# Patient Record
Sex: MALE | Race: WHITE
[De-identification: names, ages, dates, MRNs, and addresses within clinical notes are randomized per-mention and may not be internally consistent; named-entity substitution may affect disease eponyms.]

---

## 2018-01-11 ENCOUNTER — HOSPITAL ENCOUNTER (OUTPATIENT)
Dept: HOSPITAL 17 - CPRE | Age: 58
End: 2018-01-11
Attending: ORTHOPAEDIC SURGERY
Payer: OTHER GOVERNMENT

## 2018-01-11 DIAGNOSIS — M50.33: ICD-10-CM

## 2018-01-11 DIAGNOSIS — Z01.818: ICD-10-CM

## 2018-01-11 DIAGNOSIS — M50.320: ICD-10-CM

## 2018-01-11 DIAGNOSIS — Z01.810: Primary | ICD-10-CM

## 2018-01-11 DIAGNOSIS — Z01.812: ICD-10-CM

## 2018-01-11 LAB
BASOPHILS # BLD AUTO: 0.1 TH/MM3 (ref 0–0.2)
BASOPHILS NFR BLD: 0.8 % (ref 0–2)
COLOR UR: YELLOW
EOSINOPHIL # BLD: 0.2 TH/MM3 (ref 0–0.4)
EOSINOPHIL NFR BLD: 3.6 % (ref 0–4)
ERYTHROCYTE [DISTWIDTH] IN BLOOD BY AUTOMATED COUNT: 13.5 % (ref 11.6–17.2)
GLUCOSE UR STRIP-MCNC: (no result) MG/DL
HCT VFR BLD CALC: 45.7 % (ref 39–51)
HGB BLD-MCNC: 15.8 GM/DL (ref 13–17)
HGB UR QL STRIP: (no result)
KETONES UR STRIP-MCNC: (no result) MG/DL
LYMPHOCYTES # BLD AUTO: 1.3 TH/MM3 (ref 1–4.8)
LYMPHOCYTES NFR BLD AUTO: 20.1 % (ref 9–44)
MCH RBC QN AUTO: 32.9 PG (ref 27–34)
MCHC RBC AUTO-ENTMCNC: 34.5 % (ref 32–36)
MCV RBC AUTO: 95.3 FL (ref 80–100)
MONOCYTE #: 0.6 TH/MM3 (ref 0–0.9)
MONOCYTES NFR BLD: 8.9 % (ref 0–8)
MUCOUS THREADS #/AREA URNS LPF: (no result) /LPF
NEUTROPHILS # BLD AUTO: 4.4 TH/MM3 (ref 1.8–7.7)
NEUTROPHILS NFR BLD AUTO: 66.6 % (ref 16–70)
NITRITE UR QL STRIP: (no result)
PLATELET # BLD: 154 TH/MM3 (ref 150–450)
PMV BLD AUTO: 9.1 FL (ref 7–11)
RBC # BLD AUTO: 4.79 MIL/MM3 (ref 4.5–5.9)
SP GR UR STRIP: 1.01 (ref 1–1.03)
SQUAMOUS #/AREA URNS HPF: <1 /HPF (ref 0–5)
URINE LEUKOCYTE ESTERASE: (no result)
WBC # BLD AUTO: 6.7 TH/MM3 (ref 4–11)

## 2018-01-11 PROCEDURE — 81001 URINALYSIS AUTO W/SCOPE: CPT

## 2018-01-11 PROCEDURE — 93005 ELECTROCARDIOGRAM TRACING: CPT

## 2018-01-11 PROCEDURE — 36415 COLL VENOUS BLD VENIPUNCTURE: CPT

## 2018-01-11 PROCEDURE — 85025 COMPLETE CBC W/AUTO DIFF WBC: CPT

## 2018-01-11 NOTE — EKG
Date Performed: 01/11/2018       Time Performed: 09:11:16

 

PTAGE:      57 years

 

EKG:      Sinus rhythm 

 

 Normal ECG 

 

NO PREVIOUS TRACING            

 

DOCTOR:   Dulce Varma  Interpretating Date/Time  01/11/2018 16:03:15

## 2018-01-25 ENCOUNTER — HOSPITAL ENCOUNTER (INPATIENT)
Dept: HOSPITAL 17 - HSDI | Age: 58
LOS: 1 days | Discharge: HOME | DRG: 472 | End: 2018-01-26
Attending: ORTHOPAEDIC SURGERY | Admitting: ORTHOPAEDIC SURGERY
Payer: OTHER GOVERNMENT

## 2018-01-25 VITALS
DIASTOLIC BLOOD PRESSURE: 80 MMHG | HEART RATE: 112 BPM | RESPIRATION RATE: 17 BRPM | OXYGEN SATURATION: 94 % | TEMPERATURE: 96.5 F | SYSTOLIC BLOOD PRESSURE: 124 MMHG

## 2018-01-25 VITALS — WEIGHT: 256.62 LBS | BODY MASS INDEX: 34.76 KG/M2 | HEIGHT: 72 IN

## 2018-01-25 VITALS
SYSTOLIC BLOOD PRESSURE: 152 MMHG | HEART RATE: 99 BPM | RESPIRATION RATE: 17 BRPM | DIASTOLIC BLOOD PRESSURE: 95 MMHG | OXYGEN SATURATION: 93 % | TEMPERATURE: 95.5 F

## 2018-01-25 DIAGNOSIS — M48.02: ICD-10-CM

## 2018-01-25 DIAGNOSIS — I10: ICD-10-CM

## 2018-01-25 DIAGNOSIS — Z72.0: ICD-10-CM

## 2018-01-25 DIAGNOSIS — Z88.0: ICD-10-CM

## 2018-01-25 DIAGNOSIS — F32.9: ICD-10-CM

## 2018-01-25 DIAGNOSIS — F41.9: ICD-10-CM

## 2018-01-25 DIAGNOSIS — M25.78: Primary | ICD-10-CM

## 2018-01-25 DIAGNOSIS — M50.10: ICD-10-CM

## 2018-01-25 DIAGNOSIS — E78.00: ICD-10-CM

## 2018-01-25 DIAGNOSIS — M10.9: ICD-10-CM

## 2018-01-25 DIAGNOSIS — M19.90: ICD-10-CM

## 2018-01-25 DIAGNOSIS — K21.9: ICD-10-CM

## 2018-01-25 DIAGNOSIS — M50.022: ICD-10-CM

## 2018-01-25 PROCEDURE — C1713 ANCHOR/SCREW BN/BN,TIS/BN: HCPCS

## 2018-01-25 PROCEDURE — 72040 X-RAY EXAM NECK SPINE 2-3 VW: CPT

## 2018-01-25 PROCEDURE — 0QB30ZZ EXCISION OF LEFT PELVIC BONE, OPEN APPROACH: ICD-10-PCS | Performed by: ORTHOPAEDIC SURGERY

## 2018-01-25 PROCEDURE — 0RT30ZZ RESECTION OF CERVICAL VERTEBRAL DISC, OPEN APPROACH: ICD-10-PCS | Performed by: ORTHOPAEDIC SURGERY

## 2018-01-25 PROCEDURE — 0RG10A0 FUSION OF CERVICAL VERTEBRAL JOINT WITH INTERBODY FUSION DEVICE, ANTERIOR APPROACH, ANTERIOR COLUMN, OPEN APPROACH: ICD-10-PCS | Performed by: ORTHOPAEDIC SURGERY

## 2018-01-25 PROCEDURE — 76000 FLUOROSCOPY <1 HR PHYS/QHP: CPT

## 2018-01-25 RX ADMIN — VANCOMYCIN HYDROCHLORIDE SCH MLS/HR: 1 INJECTION, SOLUTION INTRAVENOUS at 08:45

## 2018-01-25 RX ADMIN — OXYTOCIN SCH MLS/HR: 10 INJECTION, SOLUTION INTRAMUSCULAR; INTRAVENOUS at 19:23

## 2018-01-25 RX ADMIN — VANCOMYCIN HYDROCHLORIDE SCH MLS/HR: 1 INJECTION, SOLUTION INTRAVENOUS at 09:25

## 2018-01-25 RX ADMIN — OXYTOCIN SCH MLS/HR: 10 INJECTION, SOLUTION INTRAMUSCULAR; INTRAVENOUS at 13:00

## 2018-01-25 NOTE — PD.OP
cc:   Wilmer Benitez MD; Fitz Benitez MD


__________________________________________________





Operative Report


Date of Surgery:  Jan 25, 2018


Preoperative Diagnosis:  


Osteophyte disc complex C5 6.


Cervical radiculopathy.


Cervical myelopathy


Postoperative Diagnosis:  


Same


Procedure:


Anterior cervical discectomy decompression and bilateral foraminotomies, C5 6.


Left anterior iliac crest bone graft


Anesthesia:


Gen.


Surgeon:


Fitz Benitez


Assistant(s):


RJ Valdivia


Operation and Findings:


EBL: 100 cc





INDICATIONS: Patient is a 57-year-old male with significant radicular findings 

related to an osteophyte disc complex, significant cervical stenosis and spinal 

cord changes on MRI scan.  He has a high-grade central stenosis at the C5 6 

level.  Despite conservative care, the patient's painful and symptomatically.  

He presents for surgical treatment.





NOTE: Maki Valdivia PA-C was present for the entire surgical procedure as my 

first assistant.  In my medical opinion her skill and care was necessary for 

proper management of this patient





PROCEDURE: The patient was brought to the operating room and anesthetized in 

the supine position.  This patient was positioned supine on the radiolucent 

table.  All pressure points were protected in the anterior cervical spine and 

iliac crest was scrubbed with alcohol followed by Hibiclens followed by 

ChloraPrep.  A timeout was done and antibiotics were given within 1 hour time 

window.





Lateral radiographic images were used identifying the proper level.  A right 

anterior incision was made in line with skin creases.  The platysma was opened 

in line with the incision.  Deep dissection continued in the interval between 

the carotid sheath and the esophagus.  The longus-coli  muscles were lifted on 

both sides and retractors were positioned allowing good exposure.  Lateral 

radiographic images were used to identify the proper level. [place] style 

interosseous pins were placed at C5 and C6 allowing exposure to that level.  

The microscope was rolled into the field.  A total discectomy was accomplished 

and posterior osteophytes were removed.  The posterior longitudinal ligament 

and annulus was taken down.  Bilateral foraminotomies were accomplished.  The 

endplates were squared up anticipating later bone grafting.  A blunt probe 

could be placed out each foramen without evidence of nerve root compromise.





The left iliac crest was approached.  A small stab incision was made allowing 

percutaneous access to the anterior iliac crest.  Multiple cores of cancellous 

bone were harvested and taken to the back table to be used for later bone 

grafting.  The wound was irrigated anesthetized and closed with 4-0 Vicryl 

followed by Dermabond.





The case was turned over to Dr. Wilmer Benitez for fusion and instrumentation 

per his dictation.





FINDINGS: There was evidence of a significant ossification of the posterior 

longitudinal ligament and annulus at the C5 6 level.  There is evidence of a 

central calcified disc herniation at that level.  The patient had an excellent 

decompression without any apparent complication.  A blunt probe could be placed 

out each foramen without difficulty.





NOTE: This surgery was performed in 2 parts.  The first part was the 

neurosurgical decompression performed under the variable power stereo 

microscope by the undersigned in addition to the bone graft.  The second 

portion of the surgery will be performed by the orthopedic spine component by co

-surgeon,  Dr. Wilmer Benitez for the anterior fusion with interbody cage and 

anterior plate.  The skill of 2 surgeons was necessary to perform distinct 

separate procedural services as dictated above and dictated in the following 

operative note by Dr. Wilmer Benitez.











Fitz Benitez MD Jan 25, 2018 11:28

## 2018-01-25 NOTE — RADRPT
EXAM DATE/TIME:  01/25/2018 09:35 

 

[facility] COMPARISON:     

No previous studies available for comparison.

 

                     

INDICATIONS :     

Cervical fusion of C5/6. 

                     

 

MEDICAL HISTORY :            

Unobtainable.    

 

SURGICAL HISTORY :        

Unobtainable. 

 

ENCOUNTER:     

Initial                                        

 

ACUITY:     

1 day      

 

PAIN SCORE:     

Non-responsive.

 

LOCATION:       

Cervical spine. 

 

FINDINGS:     

3 lateral spot intraoperative images of the cervical spine showing anterior fusion hardware at C5-6.

 

CONCLUSION:     

Spot intraoperative images showing anterior hardware at C5-6.

 

 

 

 Andres Wilson MD on January 25, 2018 at 13:28           

Board Certified Radiologist.

 This report was verified electronically.

## 2018-01-25 NOTE — MP
cc:

WILMER COREY

****

 

 

DATE OF SURGERY: January 25, 2018

 

PREOPERATIVE DIAGNOSIS

1.  C4-5 osteophyte disk complex, moderately severe spinal stenosis, spinal

cord compression.

2.  Cervical spine degenerative disk osteoarthritis.

3.  Cervical myelopathy with bilateral cervical radiculitis.

 

POSTOPERATIVE DIAGNOSIS

1.  C4-5 osteophyte disk complex, moderately severe spinal stenosis, spinal

cord compression.

2.  Cervical spine degenerative disk osteoarthritis.

3.  Cervical myelopathy with bilateral cervical radiculitis.

 

PROCEDURE

C5-6 interbody fusion; C5-6 SpineNet ACC anterior cervical cage; SpineNet

[place] anterior spinal instrumentation.

 

SURGEON

Wilmer Corey MD

 

ASSISTANT

FRED Lang

 

ESTIMATED BLOOD LOSS

75 ccs for the entire case.

 

ANESTHESIA

General.

 

DRAINS

None.

 

PLAN OF ACTIVITY

Per orders.

 

CONDITION

Stable.

 

COMPLICATIONS

None.

 

PROCEDURE

Dr. Fitz Corey and myself were co-surgeons.  Dr. Fitz Corey performed the

neurosurgical decompression at C5-6 and anterior iliac crest bone grafting.  I

was not present for his portion of the procedure.  I performed the orthopedic

stabilization and fusion portion of the procedure which I will describe in the

 

operative note.

 

My assistant FRED Lang was present for the entire surgical case.  She

was medically necessary for the entire case because of the complexity of the

case and to facilitate the performance of the procedure.  The CST at the back

table was not a skill set to manipulate the instruments. The endplates were

prepared for fusion.  The hyaline cartilage endplate was removed using angled

curettes and burs.  A 6 10 x 12 ACC cage was placed in the interspace in a

satisfactory manner, this was done under fluoroscopic guidance.  Anterior iliac

crest bone grafting was used under fluoroscopic guidance and iliac crest bone

grafting of the interbody fusion.  Anterior osteophytes were removed using

multiple different types of rongeurs and bur.  25 mm plate was used for

anterior spinal instrumentation. Two tack pins were used, appropriate position

was confirmed under fluoroscopic guidance AP and lateral plane.  Two screws

were used in the vertebral body at C5 and C6.  Each of these screws were 14 mm

length, 4.0 mm diameter fixed angled screws.  Each of these screws were

drilled, screws were inserted.  Each screw head was appropriately locked to the

plate.  Intraoperative fluoroscopy AP and lateral plane confirmed satisfactory

position of the  interbody bone graft at C5-6, satisfactory position of the ACC

anterior cervical cage at C5-6, satisfactory position of anterior spinal

instrumentation at C5-6.  The wound was irrigated with copious amounts of

sterile saline antibiotic solution. The wound itself was dry.

 

Surgiflo was used.  There was found to be no evidence of any further bleeding.

The wound was closed in multiple layers using 3-0 Vicryl suture, skin was

approximated with running subcuticular 4-0 Vicryl and Dermabond was placed over

the skin incision.  Sterile dressing was applied.  The patient had a [place]

cervical orthosis applied.  The patient tolerated the procedure well and

arrived in the recovery room in stable and satisfactory condition.

 

                              _________________________________

                              MD MARIA D Narvaez/MARIUM

D:  1/25/2018/12:26 PM

T:  1/25/2018/12:44 PM

Visit #:  [id number]

Job #:  [id number]

## 2018-01-26 VITALS
TEMPERATURE: 95.9 F | HEART RATE: 94 BPM | DIASTOLIC BLOOD PRESSURE: 88 MMHG | SYSTOLIC BLOOD PRESSURE: 133 MMHG | RESPIRATION RATE: 18 BRPM | OXYGEN SATURATION: 97 %

## 2018-01-26 VITALS
HEART RATE: 94 BPM | TEMPERATURE: 97.2 F | DIASTOLIC BLOOD PRESSURE: 79 MMHG | OXYGEN SATURATION: 96 % | SYSTOLIC BLOOD PRESSURE: 132 MMHG | RESPIRATION RATE: 17 BRPM

## 2018-01-26 VITALS
OXYGEN SATURATION: 96 % | DIASTOLIC BLOOD PRESSURE: 77 MMHG | SYSTOLIC BLOOD PRESSURE: 123 MMHG | TEMPERATURE: 97 F | HEART RATE: 105 BPM | RESPIRATION RATE: 18 BRPM

## 2018-01-26 NOTE — HHI.DS
Discharge Summary


Admission Date


Jan 25, 2018 at 06:09


Discharge Date:  Jan 26, 2018


Admitting Diagnosis


see below


Diagnosis:  


(1) Cervical spinal stenosis


Diagnosis:  Principal


ICD Codes:  M48.02 - Spinal stenosis, cervical region


(2) Degenerative disc disease, cervical


Diagnosis:  Principal


ICD Codes:  M50.30 - Other cervical disc degeneration, unspecified cervical 

region


Procedures


ACDF C56, bone graft.


Brief History


This is a 57 year old male patient


Discharge Disposition:  Discharge Home


Discharge Instructions


Diet Instructions:  As Tolerated, No Restrictions, Soft Diet, High Fiber Diet


Additional Diet Instructions:  


Soft diet for 48-72 hours


Activities You Can Perform:  See Additionl Instruction


Activities to Avoid:  Strenuous Activity


Additional Activity Instruc.:  


Cervical brace full-time


New Medications:  


Oxycodone HCl/Acetaminophen (Oxycodone-Acetaminophen 5-325) 5 Mg-325 Mg Tablet


1 TAB PO Q4H PRN for Pain, #50 TAB





 


Continued Medications:  


Allopurinol (Allopurinol) 100 Mg Tab


100 MG PO DAILY for Gout, #30 TAB 0 Refills





Alprazolam (Alprazolam) 0.25 Mg Tab


0.125 MG PO Q4H PRN for ANXIETY, TAB 0 Refills





Chlorthalidone (Chlorthalidone) 25 Mg Tab


25 MG PO DAILY, TAB 0 Refills





Clonidine (Clonidine) 0.1 Mg Tab


0.1 MG PO AS DIRECTED PRN for SBP> OR = 180, DBP> OR = 100, #60 TAB 0 Refills





Diltiazem CD 24 HR (Cardizem CD 24 HR) 240 Mg Caper


240 MG PO DAILY, #30 CAP 0 Refills





Magnesium Oxide (Magnesium Oxide) 400 Mg Tab


400 MG PO DAILY for Nutritional Supplement, TAB 0 Refills





Olmesartan (Olmesartan) 40 Mg Tab


40 MG PO DAILY for Blood Pressure Management, #30 TAB 0 Refills





Pantoprazole (Pantoprazole) 40 Mg Tab


40 MG PO DAILY for Reflux, #30 TAB 0 Refills





Paroxetine ER (Paxil CR) 25 Mg Tab


25 MG PO DAILY, #30 TAB 0 Refills

















Shonda Sneed Jan 26, 2018 07:49

## 2018-01-26 NOTE — HHI.DCPOC
Discharge Care Plan


Diagnosis:  


(1) Cervical spinal stenosis


(2) Degenerative disc disease, cervical








Your Health Problems Are: Incision/Drains





 Swelling








Goals to Promote Your Health


* To prevent worsening of your condition and complications


* To maintain your health at the optimal level


Directions to Meet Your Goals


*** Take your medications as prescribed


*** Follow your dietary instruction


*** Follow activity as directed








*** Keep your appointments as scheduled


*** Take your immunizations and boosters as scheduled


*** If your symptoms worsen call your PCP, if no PCP go to Urgent Care Center 

or Emergency Room***


*** Smoking is Dangerous to Your Health. Avoid second hand smoke***


***Call the 24-hour hour crisis hotline for domestic abuse at 1-692.318.8737***











Shonda Sneed Jan 26, 2018 07:49

## 2018-01-26 NOTE — PD.ORT.PN
Subjective


Subjective Remarks


Doing very well.  Throat is mildly sore but no complaints otherwise.  States 

pain is well controlled.  No new arm complaints.  States his 'legs even feel 

better'.  No CP or SOB.





Objective


Vitals





Vital Signs








  Date Time  Temp Pulse Resp B/P (MAP) Pulse Ox O2 Delivery O2 Flow Rate FiO2


 


1/26/18 04:11 97.2 94 17 132/79 (96) 96   


 


1/26/18 00:29 97.0 105 18 123/77 (92) 96   


 


1/25/18 20:25 96.5 112 17 124/80 (95) 94   


 


1/25/18 16:00 95.5 99 17 152/95 (114) 93   


 


1/25/18 14:00 97.8 101 12 123/69 (87) 96 Nasal Cannula 2 


 


1/25/18 13:45  98 12 148/67 (94) 95 Nasal Cannula 2 


 


1/25/18 13:30  100 12 152/82 (105) 95 Nasal Cannula 2 


 


1/25/18 13:15  102 14 142/73 (96) 95 Nasal Cannula 2 


 


1/25/18 13:00  102 14 153/68 (96) 95 Nasal Cannula 2 


 


1/25/18 12:45  103 12 170/75 (106) 97 T-Piece 15 


 


1/25/18 12:31 98.6 101 10 150/89 (109) 98 T-Piece 15 














I/O      


 


 1/25/18 1/25/18 1/25/18 1/26/18 1/26/18 1/26/18





 07:00 15:00 23:00 07:00 15:00 23:00


 


Intake Total  1800 ml 360 ml 1340 ml  


 


Output Total  75 ml    


 


Balance  1725 ml 360 ml 1340 ml  


 


      


 


Intake Oral   360 ml 240 ml  


 


IV Total    1100 ml  


 


Other  1800 ml    


 


Output Estimated Blood Loss  75 ml    


 


# Voids   3 2  


 


# Bowel Movements   0 0  








Procedures


ACDF C56, bone graft.


Objective Remarks


Sitting up in bed


NAD


VSS





C/S      Dressing c/d/i, no drainage, minimal swelling


      +motor biceps, brachiorad,  +sens, +nvi





Assessment & Plan


Ortho Post Op Day #:  1


Problem List:  


(1) Cervical spinal stenosis


ICD Codes:  M48.02 - Spinal stenosis, cervical region


(2) Degenerative disc disease, cervical


ICD Codes:  M50.30 - Other cervical disc degeneration, unspecified cervical 

region


Assessment and Plan


pod#1 s/p ACDF C56





Ortho stable.  


Pain well controlled - po meds as needed. 


Ok to d/c home today. 


Dry dressing changes after 48 hours.  He can begin to shower at that time. 


Cervical collar full time for 2 weeks.  Ok to remove for hygiene.


F/U in 2 weeks as scheduled. 


No HHC needed.











Shonda Sneed Jan 26, 2018 07:52